# Patient Record
Sex: FEMALE | Race: WHITE | NOT HISPANIC OR LATINO
[De-identification: names, ages, dates, MRNs, and addresses within clinical notes are randomized per-mention and may not be internally consistent; named-entity substitution may affect disease eponyms.]

---
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
## 2023-01-09 ENCOUNTER — APPOINTMENT (OUTPATIENT)
Dept: ORTHOPEDIC SURGERY | Facility: CLINIC | Age: 18
End: 2023-01-09

## 2023-08-07 ENCOUNTER — APPOINTMENT (OUTPATIENT)
Dept: ORTHOPEDIC SURGERY | Facility: CLINIC | Age: 18
End: 2023-08-07
Payer: COMMERCIAL

## 2023-08-07 VITALS — HEIGHT: 60 IN | WEIGHT: 150 LBS | BODY MASS INDEX: 29.45 KG/M2

## 2023-08-07 DIAGNOSIS — M76.52 PATELLAR TENDINITIS, LEFT KNEE: ICD-10-CM

## 2023-08-07 PROCEDURE — 73564 X-RAY EXAM KNEE 4 OR MORE: CPT | Mod: LT

## 2023-08-07 PROCEDURE — 99203 OFFICE O/P NEW LOW 30 MIN: CPT

## 2023-08-07 NOTE — ASSESSMENT
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  Worsened recently but no injury, no mech symptoms.   She still very active x-rays are unremarkable today.  Long discussion about need for physical therapy and discussed MRI if no improvement in 6 weeks.

## 2023-08-07 NOTE — IMAGING
[de-identified] : No effusion, no warmth Anterior tenderness to palpation Range of motion 0-140; anterior pain with flexion; tight hamstrings 5/5 quadriceps and hamstring strength Negative Lachman, negative Giovanni, negative patella apprehension Motor and sensory intact distally Non-antalgic gait  [Left] : left knee [All Views] : anteroposterior, lateral, skyline, and anteroposterior standing [There are no fractures, subluxations or dislocations. No significant abnormalities are seen] : There are no fractures, subluxations or dislocations. No significant abnormalities are seen

## 2023-08-07 NOTE — HISTORY OF PRESENT ILLNESS
[Gradual] : gradual [4] : 4 [Dull/Aching] : dull/aching [Localized] : localized [Intermittent] : intermittent [Household chores] : household chores [Rest] : rest [Physical therapy] : physical therapy [Standing] : standing [Walking] : walking [de-identified] : 08/07/23 pt presents here today with left knee pain for couple months no injury pt has try pt in the past with good relief  [] : no [FreeTextEntry1] : left knee  [FreeTextEntry5] : no injury  [de-identified] : pt

## 2023-11-21 ENCOUNTER — NON-APPOINTMENT (OUTPATIENT)
Age: 18
End: 2023-11-21